# Patient Record
Sex: FEMALE | URBAN - METROPOLITAN AREA
[De-identification: names, ages, dates, MRNs, and addresses within clinical notes are randomized per-mention and may not be internally consistent; named-entity substitution may affect disease eponyms.]

---

## 2023-05-04 ENCOUNTER — APPOINTMENT (RX ONLY)
Dept: URBAN - METROPOLITAN AREA CLINIC 88 | Facility: CLINIC | Age: 43
Setting detail: DERMATOLOGY
End: 2023-05-04

## 2023-09-20 ENCOUNTER — APPOINTMENT (RX ONLY)
Dept: URBAN - METROPOLITAN AREA CLINIC 88 | Facility: CLINIC | Age: 43
Setting detail: DERMATOLOGY
End: 2023-09-20

## 2023-09-20 ENCOUNTER — RX ONLY (OUTPATIENT)
Age: 43
Setting detail: RX ONLY
End: 2023-09-20

## 2023-09-20 DIAGNOSIS — Z01.818 ENCOUNTER FOR OTHER PREPROCEDURAL EXAMINATION: ICD-10-CM

## 2023-09-20 PROCEDURE — ? ADDITIONAL NOTES

## 2023-09-20 RX ORDER — CYCLOBENZAPRINE HYDROCHLORIDE 5 MG/1
TABLET, FILM COATED ORAL
Qty: 30 | Refills: 0 | Status: ERX | COMMUNITY
Start: 2023-09-20

## 2023-09-20 RX ORDER — CEPHALEXIN 500 MG/1
CAPSULE ORAL
Qty: 21 | Refills: 2 | Status: ERX | COMMUNITY
Start: 2023-09-20

## 2023-09-20 RX ORDER — ONDANSETRON 4 MG/1
TABLET, ORALLY DISINTEGRATING ORAL
Qty: 15 | Refills: 0 | Status: ERX | COMMUNITY
Start: 2023-09-20

## 2023-09-20 RX ORDER — APREPITANT 40 MG/1
CAPSULE ORAL
Qty: 1 | Refills: 0 | Status: ERX | COMMUNITY
Start: 2023-09-20

## 2023-09-20 RX ORDER — OXYCODONE HYDROCHLORIDE AND ACETAMINOPHEN 5; 325 MG/1; MG/1
TABLET ORAL
Qty: 28 | Refills: 0 | Status: ACTIVE

## 2023-09-20 NOTE — PROCEDURE: ADDITIONAL NOTES
Render Risk Assessment In Note?: no
Additional Notes: SX Date: 12/14/23 SX - Explant, Lift. \\nPre-op call 09/20/23\\n\\nHT: 80 CMS / WT: 59 KG / BMI:\\nAllergies: NKA\\nPMH: Iron deficit anemia (two weeks ago), Crohn's disease. (remission) Lots of symptoms related to Implants. \\nHospitalizations: Denies. \\nMeds: Iron (stopped). turmeric, naproxeno (recently), Vitamin C, D, Mg. (stopped). \\nSX HX: 2 c section. Breast aug 21 y/a silicone, under muscle, 320cc, glutes excision lipoma. 2021\\nHx of Nausea after sx: No\\nSmoke/Vape/Hookah: Denies. \\nETOH: No.\\n\\nImplants: Yes. \\nPath: Surgeon's discretion. \\n\\nExparel: Yes. \\nMyer's: Yes. \\nBB: Yes. \\n\\nSister will be taking care of her. \\nMeds pending to sent to pharmacy when she comes to [facility] [place], follow up visits scheduled. \\nLabwork CBC w Diff, CMP / EKG / requested. / Gali Farah on file. \\nRecommendations prior to surgery were given. \\nStop blood thinners 2 weeks prior. \\nNicotine test day of sx, Pt verbalized understanding and agreement.

## 2023-12-14 ENCOUNTER — APPOINTMENT (RX ONLY)
Dept: URBAN - METROPOLITAN AREA CLINIC 88 | Facility: CLINIC | Age: 43
Setting detail: DERMATOLOGY
End: 2023-12-14

## 2023-12-14 DIAGNOSIS — Z41.9 ENCOUNTER FOR PROCEDURE FOR PURPOSES OTHER THAN REMEDYING HEALTH STATE, UNSPECIFIED: ICD-10-CM

## 2023-12-14 PROCEDURE — ? OPERATIVE NOTE - BRIEF

## 2023-12-14 NOTE — PROCEDURE: OPERATIVE NOTE - BRIEF
Epidermal Closure: horizontal mattress
Monitoring: Full continuous cardiac monitoring and automated blood-pressure measurements were performed per protocol.
Surgeon: Dr. Aba Tucker DO FACS
Position: supine
Estimated Blood Loss (Cc): minimal
Detail Level: Detailed

## 2023-12-15 ENCOUNTER — APPOINTMENT (RX ONLY)
Dept: URBAN - METROPOLITAN AREA CLINIC 88 | Facility: CLINIC | Age: 43
Setting detail: DERMATOLOGY
End: 2023-12-15

## 2023-12-15 PROCEDURE — 99024 POSTOP FOLLOW-UP VISIT: CPT

## 2023-12-15 PROCEDURE — ? COUNSELING - POST-OP CHECK, BREAST IMPLANT REMOVAL

## 2023-12-15 NOTE — PROCEDURE: COUNSELING - POST-OP CHECK, BREAST IMPLANT REMOVAL
Add Postop Global No-Charge Code (97645)?: yes
Detail Level: Simple
Count Minor/Major Decisions Toward Mdm (Not Cosmetic)?: No

## 2023-12-21 ENCOUNTER — APPOINTMENT (RX ONLY)
Dept: URBAN - METROPOLITAN AREA CLINIC 88 | Facility: CLINIC | Age: 43
Setting detail: DERMATOLOGY
End: 2023-12-21

## 2023-12-26 ENCOUNTER — APPOINTMENT (RX ONLY)
Dept: URBAN - METROPOLITAN AREA CLINIC 88 | Facility: CLINIC | Age: 43
Setting detail: DERMATOLOGY
End: 2023-12-26

## 2024-02-09 ENCOUNTER — APPOINTMENT (RX ONLY)
Dept: URBAN - METROPOLITAN AREA CLINIC 88 | Facility: CLINIC | Age: 44
Setting detail: DERMATOLOGY
End: 2024-02-09